# Patient Record
Sex: FEMALE | Race: WHITE | Employment: FULL TIME | URBAN - METROPOLITAN AREA
[De-identification: names, ages, dates, MRNs, and addresses within clinical notes are randomized per-mention and may not be internally consistent; named-entity substitution may affect disease eponyms.]

---

## 2019-04-23 ENCOUNTER — OFFICE VISIT (OUTPATIENT)
Dept: UROLOGY | Age: 24
End: 2019-04-23
Payer: COMMERCIAL

## 2019-04-23 VITALS
HEART RATE: 67 BPM | BODY MASS INDEX: 17.36 KG/M2 | DIASTOLIC BLOOD PRESSURE: 62 MMHG | RESPIRATION RATE: 16 BRPM | HEIGHT: 66 IN | SYSTOLIC BLOOD PRESSURE: 112 MMHG | WEIGHT: 108 LBS

## 2019-04-23 DIAGNOSIS — N20.1 URETERAL STONE: Primary | ICD-10-CM

## 2019-04-23 DIAGNOSIS — R10.9 FLANK PAIN: ICD-10-CM

## 2019-04-23 PROCEDURE — 99204 OFFICE O/P NEW MOD 45 MIN: CPT | Performed by: UROLOGY

## 2019-04-23 RX ORDER — TAMSULOSIN HYDROCHLORIDE 0.4 MG/1
0.4 CAPSULE ORAL
COMMUNITY
Start: 2019-04-20 | End: 2019-05-07 | Stop reason: ALTCHOICE

## 2019-04-23 RX ORDER — CEPHALEXIN 500 MG/1
500 CAPSULE ORAL
COMMUNITY
Start: 2019-04-20 | End: 2019-04-27

## 2019-04-23 RX ORDER — PROMETHAZINE HYDROCHLORIDE 25 MG/1
25 TABLET ORAL
COMMUNITY
Start: 2019-04-20 | End: 2019-04-29

## 2019-04-23 RX ORDER — HYDROCODONE BITARTRATE AND ACETAMINOPHEN 5; 325 MG/1; MG/1
2 TABLET ORAL
COMMUNITY
Start: 2019-04-20 | End: 2019-04-29

## 2019-04-23 ASSESSMENT — ENCOUNTER SYMPTOMS
RESPIRATORY NEGATIVE: 1
NAUSEA: 1
ALLERGIC/IMMUNOLOGIC NEGATIVE: 1
VOMITING: 1
EYES NEGATIVE: 1

## 2019-04-23 NOTE — PROGRESS NOTES
[facility]  [facility]OLOG[facility] SPECIALISTS - [place]  [address]  [address]  [address] [place] [id number]  Dept: [address] [facility] Urology Office Note - New Patient    Patient:  Kristine Marlow  YOB: 1995  Date: 4/23/2019    The patient is a 30 y.o. female who presentstoday for evaluation of the following problems:   Chief Complaint   Patient presents with    New Patient    Nephrolithiasis    referred by Aurelia Baumann MD.    HPI  Here as a New Pt from [facility] f/u for kidney stone. Awoke Saturday morning with dysuria, frequency, urgency which progressed to rt lower back pain and spasms with lower bd pain, 9-10/10. She has no fever, but did expereince nausea and vomiting. She went to ER, had CT and was told she had a stone. She has been using Norco 1 BID over the last 24 hours which seems to manage her pain. Her last dose was 8 pm, pain now 2/19, rt lower back, no hematuria, no dysuria. Is currently on Keflex, her urine was normal in ER. She continues Tamsulosin daily. She has been straining and no stone ahs been seen. Pain is now a 2/10. (Patient's old records have been requested, reviewed and summarized in today's note.)    Summary of old records: N/A    Additional History: N/A    ProceduresToday: N/A    Urinalysis today:  No results found for this visit on 04/23/19.     AUA Symptom Score (4/23/2019):  INCOMPLETE EMPTYING: How often have you had the sensation of not emptying your bladder?: Not at all  FREQUENCY: How often do you have to urinate less than every two hours?: Not at all  INTERMITTENCY: How often have you found you stopped and started again several times when you urinated?: Not at all  URGENCY: How often have you found it difficult to postpone urination?: Not at all  WEAK STREAM: How often have you had a weak urinary stream?: Not at all    NOCTURIA: How many times did you typically get up at night to uriniate?: 1 Time  TOTAL I-PSS SCORE[de-identified] 1       Last BUN andcreatinine:  No results found for: BUN  No results found for: CREATININE    Additional Lab/Culture results: none    Imaging Reviewed during this Office Visit: CT shows a 2mm distal right ureteral stone. (results were independently reviewed byphysician and radiology report verified)    PAST MEDICAL, FAMILY AND SOCIAL HISTORY:     Past Medical History:   Diagnosis Date    Acne     Back Acne     Anxiety     Arthralgia     B/L knees    Depression     Migraines     Vision abnormalities      Past Surgical History:   Procedure Laterality Date    CYST REMOVAL      Left ear    EYE SURGERY      Right    GUM SURGERY  2004    transfer     Family History   Problem Relation Age of Onset    Osteoporosis Paternal Grandmother     Arthritis Paternal Grandmother     Depression Paternal Grandmother     Anxiety Disorder Paternal Grandmother     Hypertension Maternal Grandfather     Cancer Maternal Grandfather     Diabetes Maternal Aunt     Breast Cancer Neg Hx     Colon Cancer Neg Hx     Eclampsia Neg Hx     Ovarian Cancer Neg Hx      Labor Neg Hx     Spont Abortions Neg Hx     Stroke Neg Hx      Outpatient Medications Marked as Taking for the 19 encounter (Office Visit) with Juanna Merlin, MD   Medication Sig Dispense Refill    HYDROcodone-acetaminophen (NORCO) 5-325 MG per tablet Take 2 tablets by mouth.  cephALEXin (KEFLEX) 500 MG capsule Take 500 mg by mouth      promethazine (PHENERGAN) 25 MG tablet Take 25 mg by mouth      tamsulosin (FLOMAX) 0.4 MG capsule Take 0.4 mg by mouth      NAYAN 24 FE 1-20 MG-MCG(24) TABS Take 1 tablet by mouth daily  12    Multiple Vitamins-Minerals (MULTIVITAMIN PO) Take 1 tablet by mouth daily         Patient has no known allergies.   Social History     Tobacco Use   Smoking Status Never Smoker   Smokeless Tobacco Never Used      (If patient a smoker, smoking cessation counseling offered)   Social History     Substance and Sexual Activity   Alcohol Use No    Comment: OCC       REVIEW OF SYSTEMS:  Review of Systems    Physical Exam:    This a 30 y.o. female      Vitals:    04/23/19 0907   BP: 112/62   Pulse: 67   Resp: 16     Body mass index is 17.44 kg/m². Physical Exam  Constitutional: Patient in no acute distress, ggod grooming, appropriately dressed  Neuro: Alert and oriented to person, place and time. Psych:Mood normal, affect normal  Skin: No rash noted  Lungs: Respiratory effort is normal  Cardiovascular: strong and regular, no lower leg edema  Abdomen: Soft, non-tender, non-distended with no CVA,    Lymphatics: No cervical palpable lymphadenopathy. Bladder non-tender and not distended. Musculoskeletal: Normal gait and station        Assessment and Plan      1. Ureteral stone    2. Flank pain            Plan:         She is doing better at this time. Pain is a 2/10. Will continue trial of stone passage. On Flomax. Prescriptions Ordered:  No orders of the defined types were placed in this encounter. Orders Placed:  No orders of the defined types were placed in this encounter. Adam Banda MD    Agree with the ROS entered by the MA.

## 2019-04-23 NOTE — PROGRESS NOTES
Review of Systems   Constitutional: Negative. HENT: Negative. Eyes: Negative. Respiratory: Negative. Cardiovascular: Negative. Gastrointestinal: Positive for nausea and vomiting. Endocrine: Negative. Genitourinary: Positive for flank pain. Allergic/Immunologic: Negative. Neurological: Positive for headaches. Hematological: Negative. Psychiatric/Behavioral: Negative.

## 2019-05-07 ENCOUNTER — OFFICE VISIT (OUTPATIENT)
Dept: UROLOGY | Age: 24
End: 2019-05-07
Payer: COMMERCIAL

## 2019-05-07 VITALS — HEART RATE: 68 BPM | DIASTOLIC BLOOD PRESSURE: 67 MMHG | TEMPERATURE: 97.9 F | SYSTOLIC BLOOD PRESSURE: 93 MMHG

## 2019-05-07 DIAGNOSIS — N20.1 URETERAL STONE: Primary | ICD-10-CM

## 2019-05-07 DIAGNOSIS — R10.9 FLANK PAIN: ICD-10-CM

## 2019-05-07 PROCEDURE — 99213 OFFICE O/P EST LOW 20 MIN: CPT | Performed by: UROLOGY

## 2019-05-07 ASSESSMENT — ENCOUNTER SYMPTOMS
ABDOMINAL PAIN: 0
VOMITING: 0
COUGH: 0
EYE REDNESS: 0
WHEEZING: 0
SHORTNESS OF BREATH: 0
EYE PAIN: 0
NAUSEA: 0
BACK PAIN: 0
COLOR CHANGE: 0

## 2019-05-07 NOTE — LETTER
[facility]  [facility] UROLOGY SPECIALISTS - [place]  [address]  [address]  [address] [place] [phone]  Dept: [phone]  Dept Fax: [phone]        5/7/19    Patient: Kristine Marlow  YOB: 1995    Dear Aurelia Baumann MD,    I had the pleasure of seeing one of your patients, Mauricio Burns today in the office today. Below are the relevant portions of my assessment and plan of care. IMPRESSION:     1. Ureteral stone    2. Flank pain        PLAN:        Jovana Seen well  Pain has resolved. caught a possible small stone. Discussed stone prevention. F/U as needed. Return if symptoms worsen or fail to improve. Prescriptions Ordered:  No orders of the defined types were placed in this encounter. Orders Placed:  Orders Placed This Encounter   Procedures    XR ABDOMEN (KUB) (SINGLE AP VIEW)     Standing Status:   Future     Standing Expiration Date:   5/7/2020     Order Specific Question:   Reason for exam:     Answer:   ureteral stone         Thank you for allowing me to participate in the care of this patient. I will keep you updated on this patient's follow up and I look forward to serving you and your patients again in the future.         Argelia Byers MD

## 2019-05-07 NOTE — PROGRESS NOTES
Review of Systems   Constitutional: Negative for activity change, chills and fever. Eyes: Negative for pain, redness and visual disturbance. Respiratory: Negative for cough, shortness of breath and wheezing. Cardiovascular: Negative for chest pain and leg swelling. Gastrointestinal: Negative for abdominal pain, nausea and vomiting. Genitourinary: Negative for difficulty urinating, dysuria, frequency, hematuria, pelvic pain, vaginal bleeding and vaginal discharge. Musculoskeletal: Negative for back pain, joint swelling and myalgias. Skin: Negative for color change and rash. Neurological: Negative for dizziness, tremors and numbness. Hematological: Negative for adenopathy. Does not bruise/bleed easily.

## 2019-05-07 NOTE — PROGRESS NOTES
Grandmother     Hypertension Maternal Grandfather     Cancer Maternal Grandfather     Diabetes Maternal Aunt     Breast Cancer Neg Hx     Colon Cancer Neg Hx     Eclampsia Neg Hx     Ovarian Cancer Neg Hx      Labor Neg Hx     Spont Abortions Neg Hx     Stroke Neg Hx      No outpatient medications have been marked as taking for the 19 encounter (Office Visit) with Jaye Mix MD.      (All medications reviewed and updated by provider sincelast office visit or hospitalization)   Patient has no known allergies. Social History     Tobacco Use   Smoking Status Never Smoker   Smokeless Tobacco Never Used      (If patient a smoker, smoking cessation counseling offered)     Social History     Substance and Sexual Activity   Alcohol Use No    Comment: OCC       REVIEW OF SYSTEMS:  Review of Systems      Physical Exam:      Vitals:    19 0955   BP: 93/67   Pulse: 68   Temp: 97.9 °F (36.6 °C)     There is no height or weight on file to calculate BMI. Patient is a 30 y.o. female in noacute distress and alert and oriented to person, place and time. Physical Exam  Constitutional: Patient in no acute distress. Neuro: Alert andoriented to person, place and time. Psych: Mood normal, affect normal  Skin: No rash noted  Lungs: Respiratory effort is normal  Cardiovascular: Warm & Pink  Abdomen: Soft, non-tender, non-distended with no CVA,  No flank tenderness,  Or hepatosplenomegaly   Lymphatics: No palpable lymphadenopathy. Bladder non-tender and not distended. Musculoskeletal: Normalgait and station      Assessment and Plan      1. Ureteral stone    2. Flank pain           Plan:         Winn Abran well  Pain has resolved. caught a possible small stone. Discussed stone prevention. F/U as needed. Return if symptoms worsen or fail to improve. Prescriptions Ordered:  No orders of the defined types were placed in this encounter.      Orders Placed:  Orders Placed This Encounter   Procedures    XR ABDOMEN (KUB) (SINGLE AP VIEW)     Standing Status:   Future     Standing Expiration Date:   5/7/2020     Order Specific Question:   Reason for exam:     Answer:   ureteral stone            Devan Gaines MD    Agree with the ROS entered by the MA.

## 2020-09-22 PROBLEM — F41.9 ANXIETY: Status: ACTIVE | Noted: 2019-06-19

## 2020-09-22 PROBLEM — N75.0 BARTHOLIN GLAND CYST: Status: ACTIVE | Noted: 2020-01-31

## 2020-09-22 PROBLEM — Z86.69 HX OF MIGRAINES: Status: ACTIVE | Noted: 2019-06-19

## 2023-01-07 ENCOUNTER — NURSE TRIAGE (OUTPATIENT)
Dept: OTHER | Facility: CLINIC | Age: 28
End: 2023-01-07

## 2023-01-07 NOTE — TELEPHONE ENCOUNTER
Location of patient: [place]    Subjective: Caller states \"I woke up with bruising on my legs. \"     Current Symptoms: bruising on inner thighs     Onset: 0 day ago;     Associated Symptoms: NA    Pain Severity: 0/10; N/A; none    Temperature: None recently     What has been tried: none    LMP: A couple weeks ago Pregnant: No    Recommended disposition: See HCP within 4 Hours (or PCP triage)    Care advice provided, patient verbalizes understanding; denies any other questions or concerns; instructed to call back for any new or worsening symptoms. Patient/caller agrees to proceed to nearest [facility]     This triage is a result of a call to [address] [place] [address]. Please do not respond to the triage nurse through this encounter. Any subsequent communication should be directly with the patient.     Reason for Disposition   [1] Not caused by an injury AND [2] 5 or more bruises now    Protocols used: Bruises-ADULT-

## 2023-02-22 ENCOUNTER — TELEPHONE (OUTPATIENT)
Dept: ONCOLOGY | Age: 28
End: 2023-02-22

## 2023-02-22 NOTE — TELEPHONE ENCOUNTER
LVM for pt to call and schedule a consult with Dr Gisela Olivas    Electronically signed by Fran Philip on 2/22/2023 at 3:48 PM

## 2023-03-01 ENCOUNTER — TELEPHONE (OUTPATIENT)
Dept: ONCOLOGY | Age: 28
End: 2023-03-01

## 2023-03-01 NOTE — TELEPHONE ENCOUNTER
PT CALLED WRITER FOR A SOONER APPOINTMENT WITH DR Nori Rucker. PT IS CURRENTLY SCHEDULED FOR 04/03/23.  WRITER SCHEDULE CONSULTATION APPOINTMENT FOR 03/16/23 @2:30PM.

## 2023-03-16 ENCOUNTER — INITIAL CONSULT (OUTPATIENT)
Dept: ONCOLOGY | Age: 28
End: 2023-03-16

## 2023-03-16 ENCOUNTER — TELEPHONE (OUTPATIENT)
Dept: ONCOLOGY | Age: 28
End: 2023-03-16

## 2023-03-16 VITALS
TEMPERATURE: 99.2 F | BODY MASS INDEX: 18.51 KG/M2 | SYSTOLIC BLOOD PRESSURE: 116 MMHG | HEART RATE: 81 BPM | RESPIRATION RATE: 16 BRPM | DIASTOLIC BLOOD PRESSURE: 66 MMHG | WEIGHT: 114.7 LBS

## 2023-03-16 DIAGNOSIS — R23.3 EASY BRUISING: Primary | ICD-10-CM

## 2023-03-16 DIAGNOSIS — D69.1 PLATELET DYSFUNCTION (HCC): ICD-10-CM

## 2023-03-16 PROCEDURE — 99202 OFFICE O/P NEW SF 15 MIN: CPT | Performed by: INTERNAL MEDICINE

## 2023-03-16 PROCEDURE — 99245 OFF/OP CONSLTJ NEW/EST HI 55: CPT | Performed by: INTERNAL MEDICINE

## 2023-03-16 NOTE — PROGRESS NOTES
_           Ms. Luli Goldman is a very pleasant 25 y.o. female with history of multiple co morbidities as listed. Patient is referred for evaluation and further management of easy bruising. Patient had multiple episodes of extensive bruising over medial aspect of both thighs since 2023. No episodes of bleeding. Patient used to have heavy menses in the past.  Nothing at the present time. Denies any nosebleed or gum bleed. No melena or hematochezia. No hematemesis. No joints pain or swelling. No fever or night sweats. No weight loss or decreased appetite. Patient is not on blood thinners or NSAIDs no other medications from over-the-counter. Patient's mother had an episode of significant bleeding after . Otherwise no significant history of bleeding tendency in the family. Patient denies smoking or alcohol drinking. Tobin Stafford PAST MEDICAL HISTORY: has a past medical history of Acne, Anxiety, Arthralgia, Depression, Migraines, and Vision abnormalities. PAST SURGICAL HISTORY: has a past surgical history that includes Eye surgery; cyst removal; and Gum surgery (). CURRENT MEDICATIONS:  has a current medication list which includes the following prescription(s): sertraline, fish oil, rolaids, fluticasone, melatonin, sinai 24 fe, and multiple vitamin. ALLERGIES:  has No Known Allergies. FAMILY HISTORY: Negative for any hematological or oncological conditions. SOCIAL HISTORY:  reports that she has never smoked. She has never used smokeless tobacco. She reports that she does not drink alcohol and does not use drugs. REVIEW OF SYSTEMS:     General: Positive for weakness and fatigue. No unanticipated weight loss or decreased appetite. No fever or chills. Eyes: No blurred vision, eye pain or double vision. Ears: No hearing problems or drainage. No tinnitus.    Throat: No sore throat, problems with swallowing or dysphagia. Respiratory: No cough, sputum or hemoptysis. No shortness of breath. No pleuritic chest pain. Cardiovascular: No chest pain, orthopnea or PND. No lower extremity edema. No palpitation. Gastrointestinal: No problems with swallowing. No abdominal pain or bloating. No nausea or vomiting. No diarrhea or constipation. No GI bleeding. Genitourinary: No dysuria, hematuria, frequency or urgency. Musculoskeletal: No muscle aches or pains. No limitation of movement. No back pain. No gait disturbance, No joint complaints. Dermatologic: No skin rashes or pruritus. No skin lesions or discolorations. Psychiatric: No depression, anxiety, or stress or signs of schizophrenia. No change in mood or affect. Hematologic: As above. Infectious disease: No fever, chills or frequent infections. Endocrine: No polydipsia or polyuria. No temperature intolerance. Neurologic: No headaches or dizziness. No weakness or numbness of the extremities. No changes in balance, coordination,  memory, mentation, behavior. Allergic/Immunologic: No nasal congestion or hives. No repeated infections. PHYSICAL EXAM:  The patient is not in acute distress. Vital signs: Blood pressure 116/66, pulse 81, temperature 99.2 °F (37.3 °C), temperature source Temporal, resp. rate 16, weight 114 lb 11.2 oz (52 kg), not currently breastfeeding.      General appearance - well appearing, not in pain or distress  Mental status - good mood, alert and oriented  Eyes - pupils equal and reactive, extraocular eye movements intact  Ears - bilateral TM's and external ear canals normal  Nose - normal and patent, no erythema, discharge or polyps  Mouth - mucous membranes moist, pharynx normal without lesions  Neck - supple, no significant adenopathy  Lymphatics - no palpable lymphadenopathy, no hepatosplenomegaly  Chest - clear to auscultation, no wheezes, rales or rhonchi, symmetric air entry  Heart - normal rate, regular rhythm, normal S1, S2, no murmurs, rubs, clicks or gallops  Abdomen - soft, nontender, nondistended, no masses or organomegaly  Neurological - alert, oriented, normal speech, no focal findings or movement disorder noted  Musculoskeletal - no joint tenderness, deformity or swelling  Extremities - peripheral pulses normal, no pedal edema, no clubbing or cyanosis  Skin - normal coloration and turgor, no rashes, no suspicious skin lesions noted     Review of Diagnostic data:   No results found for: WBC, HGB, HCT, MCV, PLT    Chemistry    No results found for: NA, K, CL, CO2, BUN, CREATININE, GLU No results found for: CALCIUM, ALKPHOS, AST, ALT, BILITOT       [unfilled]      IMPRESSION:   Easy bruising  Platelet dysfunction    PLAN: Records, labs and images were reviewed and discussed with the patient. I explained to the patient the nature of this problem with easy bruising and probability of platelet dysfunction bleeding tendency. Explained that the symptoms are related to platelet dysfunction rather than clotting factor deficiencies. Labs which were done showed normal platelet counts and von Willebrand test was normal.  Further work-up will be done including ristocetin cofactor and platelet aggregation test and platelets electron microscopy. We will look for possibility of delta granule storage pool deficiency. We will contact the patient with the results of these tests as they become available. I explained to the patient that she may not need to have any treatment at the present time but if we find significant platelet dysfunction she may be a candidate for treatment prior to surgical procedures. Patient totally understands. Patient's questions were answered to the best of her satisfaction and she verbalized full understanding and agreement.

## 2023-03-16 NOTE — LETTER
_    Ryan Nazario MD    3/16/2023     [facility] [address] [facility]    Dear Dr Morro Multani:    Thank you for referring Jacy Jung, 1995, to me for evaluation. Below are the relevant portions of my assessment and plan of care. Ms. Jacy Jung is a very pleasant 25 y.o. female with history of multiple co morbidities as listed. Patient is referred for evaluation and further management of easy bruising. Patient had multiple episodes of extensive bruising over medial aspect of both thighs since 2023. No episodes of bleeding. Patient used to have heavy menses in the past.  Nothing at the present time. Denies any nosebleed or gum bleed. No melena or hematochezia. No hematemesis. No joints pain or swelling. No fever or night sweats. No weight loss or decreased appetite. Patient is not on blood thinners or NSAIDs no other medications from over-the-counter. Patient's mother had an episode of significant bleeding after . Otherwise no significant history of bleeding tendency in the family. Patient denies smoking or alcohol drinking. Chandni Smith PAST MEDICAL HISTORY: has a past medical history of Acne, Anxiety, Arthralgia, Depression, Migraines, and Vision abnormalities. PAST SURGICAL HISTORY: has a past surgical history that includes Eye surgery; cyst removal; and Gum surgery (). CURRENT MEDICATIONS:  has a current medication list which includes the following prescription(s): sertraline, fish oil, rolaids, fluticasone, melatonin, sinai 24 fe, and multiple vitamin. ALLERGIES:  has No Known Allergies. FAMILY HISTORY: Negative for any hematological or oncological conditions. SOCIAL HISTORY:  reports that she has never smoked. She has never used smokeless tobacco. She reports that she does not drink alcohol and does not use drugs. REVIEW OF SYSTEMS:     · General: Positive for weakness and fatigue.  No unanticipated weight loss or decreased appetite. No fever or chills. · Eyes: No blurred vision, eye pain or double vision. · Ears: No hearing problems or drainage. No tinnitus. · Throat: No sore throat, problems with swallowing or dysphagia. · Respiratory: No cough, sputum or hemoptysis. No shortness of breath. No pleuritic chest pain. · Cardiovascular: No chest pain, orthopnea or PND. No lower extremity edema. No palpitation. · Gastrointestinal: No problems with swallowing. No abdominal pain or bloating. No nausea or vomiting. No diarrhea or constipation. No GI bleeding. · Genitourinary: No dysuria, hematuria, frequency or urgency. · Musculoskeletal: No muscle aches or pains. No limitation of movement. No back pain. No gait disturbance, No joint complaints. · Dermatologic: No skin rashes or pruritus. No skin lesions or discolorations. · Psychiatric: No depression, anxiety, or stress or signs of schizophrenia. No change in mood or affect. · Hematologic: As above. · Infectious disease: No fever, chills or frequent infections. · Endocrine: No polydipsia or polyuria. No temperature intolerance. · Neurologic: No headaches or dizziness. No weakness or numbness of the extremities. No changes in balance, coordination,  memory, mentation, behavior. · Allergic/Immunologic: No nasal congestion or hives. No repeated infections. PHYSICAL EXAM:  The patient is not in acute distress. Vital signs: Blood pressure 116/66, pulse 81, temperature 99.2 °F (37.3 °C), temperature source Temporal, resp. rate 16, weight 114 lb 11.2 oz (52 kg), not currently breastfeeding.      General appearance - well appearing, not in pain or distress  Mental status - good mood, alert and oriented  Eyes - pupils equal and reactive, extraocular eye movements intact  Ears - bilateral TM's and external ear canals normal  Nose - normal and patent, no erythema, discharge or polyps  Mouth - mucous membranes moist, pharynx normal without lesions  Neck - supple, no significant adenopathy  Lymphatics - no palpable lymphadenopathy, no hepatosplenomegaly  Chest - clear to auscultation, no wheezes, rales or rhonchi, symmetric air entry  Heart - normal rate, regular rhythm, normal S1, S2, no murmurs, rubs, clicks or gallops  Abdomen - soft, nontender, nondistended, no masses or organomegaly  Neurological - alert, oriented, normal speech, no focal findings or movement disorder noted  Musculoskeletal - no joint tenderness, deformity or swelling  Extremities - peripheral pulses normal, no pedal edema, no clubbing or cyanosis  Skin - normal coloration and turgor, no rashes, no suspicious skin lesions noted     Review of Diagnostic data:   No results found for: WBC, HGB, HCT, MCV, PLT    Chemistry    No results found for: NA, K, CL, CO2, BUN, CREATININE, GLU No results found for: CALCIUM, ALKPHOS, AST, ALT, BILITOT       [unfilled]      IMPRESSION:   Easy bruising  Platelet dysfunction    PLAN: Records, labs and images were reviewed and discussed with the patient. I explained to the patient the nature of this problem with easy bruising and probability of platelet dysfunction bleeding tendency. Explained that the symptoms are related to platelet dysfunction rather than clotting factor deficiencies. Labs which were done showed normal platelet counts and von Willebrand test was normal.  Further work-up will be done including ristocetin cofactor and platelet aggregation test and platelets electron microscopy. We will look for possibility of delta granule storage pool deficiency. We will contact the patient with the results of these tests as they become available. I explained to the patient that she may not need to have any treatment at the present time but if we find significant platelet dysfunction she may be a candidate for treatment prior to surgical procedures. Patient totally understands.   Patient's questions were answered to the best of her satisfaction and she verbalized full understanding and agreement. If you have questions, please do not hesitate to call me. I look forward to following June Kennedy along with you. Sincerely,                            [address] [facility]                            This note is created with the assistance of a speech recognition program.  While intending to generate a document that actually reflects the content of the visit, the document can still have some errors including those of syntax and sound a like substitutions which may escape proof reading. It such instances, actual meaning can be extrapolated by contextual diversion.

## 2023-03-16 NOTE — Clinical Note
March 16, 2023       Kimberly Pittman YOB: 1995   [address] [place] [id number] Date of Visit:  3/16/2023       To Whom It May Concern: It is my medical opinion that Yessica Mcclain {Work release (duty restriction):08828}. If you have any questions or concerns, please don't hesitate to call.     Sincerely,        Yessica Ace MD

## 2023-03-16 NOTE — TELEPHONE ENCOUNTER
[facility] ARRIVES AMBULATORY FOR CONSULTATION APPOINTMENT  DR OZUNA IN TO SEE PATIENT  ORDERS RECEIVED  LABS AT [facility]  CALL WITH RESULTS  LABS PLATELET DYSFUCTION, PLATELET AGGREGATION, PLATELET ELECTRON MICROSCOPY TO BE DONE AT [facility], ORDERS GIVEN TO PT. OUR FAX NUMBER WRITTEN ON ORDERS TO FAX RESULTS BACK.  NOTE PRINTED AND TAKEN INTO TRIAGE TO WATCH FOR RESULTS AND NOTIFY  [facility] SHRADDHA SHOEMAKER  AVS PRINTED AND GIVEN TO PATIENT WITH INSTRUCTIONS  PATIENT DISCHARGED AMBULATORY

## 2023-04-21 ENCOUNTER — TELEPHONE (OUTPATIENT)
Dept: INFUSION THERAPY | Facility: MEDICAL CENTER | Age: 28
End: 2023-04-21

## 2023-04-21 NOTE — TELEPHONE ENCOUNTER
Returned call to patient. She is asking if we received lab results from [facility] from 1 month ago. Patient informed we did receive results.

## 2024-05-20 PROBLEM — S62.661B: Status: ACTIVE | Noted: 2024-05-20

## 2024-10-15 ENCOUNTER — HOSPITAL ENCOUNTER (OUTPATIENT)
Age: 29
Setting detail: SPECIMEN
Discharge: HOME | End: 2024-10-15

## 2024-10-15 DIAGNOSIS — Z78.9 HEPATITIS B VACCINATION STATUS UNKNOWN: ICD-10-CM

## 2024-10-15 DIAGNOSIS — Z78.9 VARICELLA VACCINATION STATUS UNKNOWN: ICD-10-CM

## 2024-10-15 DIAGNOSIS — Z11.1 SCREENING EXAMINATION FOR PULMONARY TUBERCULOSIS: ICD-10-CM

## 2024-10-15 DIAGNOSIS — Z11.59 ENCOUNTER FOR HEPATITIS C SCREENING TEST FOR LOW RISK PATIENT: ICD-10-CM

## 2024-10-15 DIAGNOSIS — Z78.9 HISTORY OF MEASLES, MUMPS, RUBELLA (MMR) VACCINATION UNKNOWN: ICD-10-CM

## 2024-10-15 LAB
HBV SURFACE AB SERPL IA-ACNC: <3.5 MIU/ML
HBV SURFACE AG SERPL QL IA: NONREACTIVE
HCV AB SERPL QL IA: NONREACTIVE
RUBV IGG SERPL QL IA: 116 IU/ML

## 2024-10-16 LAB
MEV IGG SER-ACNC: 2.24
MUV IGG SER IA-ACNC: 2.44
VZV IGG SER QL IA: 0.72

## 2024-10-18 LAB
QUANTI TB GOLD PLUS: NEGATIVE
QUANTI TB1 MINUS NIL: 0.01 IU/ML
QUANTI TB2 MINUS NIL: 0 IU/ML
QUANTIFERON MITOGEN: 9.95 IU/ML
QUANTIFERON NIL: 0.05 IU/ML

## 2024-10-18 NOTE — RESULT ENCOUNTER NOTE
Call - needs a varicella vaccine update and to restart the hep B series (0, 1 months, and six months) - please schedule